# Patient Record
(demographics unavailable — no encounter records)

---

## 2025-04-11 NOTE — HISTORY OF PRESENT ILLNESS
[FreeTextEntry1] : "Nahum" DM2 diagnosed less than 10 years ago, complicated by CAD and CKD. Taking metformin 850 mg twice daily and Farxiga 5 mg daily. Compliance: .good Tolerating: yes Therapies tried: metformin, Farxiga Last HgA1c in May 2024 was 8.2%, previously 8.4% in November 2023. Checks BG: never. Has glucometer in the box. Hypoglycemia: Has awareness. Polyuria, polydipsia, unintentional weight loss: Denies   Diabetic emergencies/admissions: Denies   Microvascular complications Last albumin/creatinine ratio: unknown Neuropathy symptoms: Denies Microfilament exam: Normal; April 2025 Diabetic retinopathy: Denies Last dilated eye exam: at least annually   Macrovascular complications CAD/CVA/PVD: CAD s/p stent (2023)   HTN: On spironolactone 25 mg daily. LDL:  Goal is < 70 mg/dL.   Diet: Unrestricted. Exercise: sedentary; hip pain Immunizations: Defers. Family DM History: Denies

## 2025-04-11 NOTE — PHYSICAL EXAM
[Normal] : normal [0] : 0 in the dorsalis pedis [Normal Sensation on Monofilament Testing] : normal sensation on monofilament testing of lower extremities [Alert] : alert [Well Nourished] : well nourished [Healthy Appearance] : healthy appearance [No Acute Distress] : no acute distress [Well Developed] : well developed [Normal Voice/Communication] : normal voice communication [Normal Sclera/Conjunctiva] : normal sclera/conjunctiva [EOMI] : extra ocular movement intact [Normal Hearing] : hearing was normal [No Neck Mass] : no neck mass was observed [No Respiratory Distress] : no respiratory distress [Normal Rate and Effort] : normal respiratory rate and effort [Normal Rate] : heart rate was normal [Regular Rhythm] : with a regular rhythm [No Edema] : no peripheral edema [No Varicosities] : there were no varicosital changes [Not Distended] : not distended [Spine Straight] : spine straight [No Stigmata of Cushings Syndrome] : no stigmata of Cushings Syndrome [Normal Gait] : normal gait [No Involuntary Movements] : no involuntary movements were seen [Normal Strength/Tone] : muscle strength and tone were normal [No Rash] : no rash [Abdominal Striae] : no abdominal striae [Acanthosis Nigricans] : no acanthosis nigricans [Foot Ulcers] : no foot ulcers [Diminished Throughout Both Feet] : normal tactile sensation with monofilament testing throughout both feet [No Tremors] : no tremors [Oriented x3] : oriented to person, place, and time [Normal Affect] : the affect was normal [Recent Memory Normal] : recent memory was not impaired [Normal Insight/Judgement] : insight and judgment were intact [Normal Mood] : the mood was normal [Remote Memory Normal] : remote memory was not impaired [de-identified] : No observed focal neurological deficits.

## 2025-04-11 NOTE — ASSESSMENT
[FreeTextEntry1] : The results of the testing will be communicated to the patient over the phone once they are available, along with any additional recommendations if necessary. Patient will repeat routine DM labs prior to next visit in 4 months. Nahum was encouraged to reach out to the clinic at any point in the interim with questions and concerns. All questions and concerns were fully addressed to patient's satisfaction. Patient is in agreement with stated plan.  Note: This document has been prepared using voice dictation software. While efforts have been made to ensure accuracy, some typographical or transcription errors may be present. Please interpret the information accordingly.

## 2025-04-11 NOTE — PHYSICAL EXAM
[Normal] : normal [0] : 0 in the dorsalis pedis [Normal Sensation on Monofilament Testing] : normal sensation on monofilament testing of lower extremities [Alert] : alert [Well Nourished] : well nourished [Healthy Appearance] : healthy appearance [No Acute Distress] : no acute distress [Well Developed] : well developed [Normal Voice/Communication] : normal voice communication [Normal Sclera/Conjunctiva] : normal sclera/conjunctiva [EOMI] : extra ocular movement intact [Normal Hearing] : hearing was normal [No Neck Mass] : no neck mass was observed [No Respiratory Distress] : no respiratory distress [Normal Rate and Effort] : normal respiratory rate and effort [Normal Rate] : heart rate was normal [Regular Rhythm] : with a regular rhythm [No Edema] : no peripheral edema [No Varicosities] : there were no varicosital changes [Not Distended] : not distended [Spine Straight] : spine straight [No Stigmata of Cushings Syndrome] : no stigmata of Cushings Syndrome [Normal Gait] : normal gait [No Involuntary Movements] : no involuntary movements were seen [Normal Strength/Tone] : muscle strength and tone were normal [No Rash] : no rash [Abdominal Striae] : no abdominal striae [Acanthosis Nigricans] : no acanthosis nigricans [Foot Ulcers] : no foot ulcers [Diminished Throughout Both Feet] : normal tactile sensation with monofilament testing throughout both feet [No Tremors] : no tremors [Oriented x3] : oriented to person, place, and time [Normal Affect] : the affect was normal [Recent Memory Normal] : recent memory was not impaired [Normal Insight/Judgement] : insight and judgment were intact [Normal Mood] : the mood was normal [Remote Memory Normal] : remote memory was not impaired [de-identified] : No observed focal neurological deficits.

## 2025-04-11 NOTE — REASON FOR VISIT
[Initial Evaluation] : an initial evaluation [DM Type 2] : DM Type 2 [Weight Management/Obesity] : weight management/obesity [Source: ______] : History obtained from DOMINGO [Spouse] : spouse